# Patient Record
Sex: FEMALE | Race: WHITE | NOT HISPANIC OR LATINO
[De-identification: names, ages, dates, MRNs, and addresses within clinical notes are randomized per-mention and may not be internally consistent; named-entity substitution may affect disease eponyms.]

---

## 2022-04-12 ENCOUNTER — TRANSCRIPTION ENCOUNTER (OUTPATIENT)
Age: 67
End: 2022-04-12

## 2022-04-12 ENCOUNTER — APPOINTMENT (OUTPATIENT)
Dept: ORTHOPEDIC SURGERY | Facility: CLINIC | Age: 67
End: 2022-04-12
Payer: MEDICARE

## 2022-04-12 VITALS — BODY MASS INDEX: 28.25 KG/M2 | WEIGHT: 180 LBS | HEIGHT: 67 IN

## 2022-04-12 PROBLEM — Z00.00 ENCOUNTER FOR PREVENTIVE HEALTH EXAMINATION: Status: ACTIVE | Noted: 2022-04-12

## 2022-04-12 PROCEDURE — 20611 DRAIN/INJ JOINT/BURSA W/US: CPT | Mod: RT

## 2022-04-12 PROCEDURE — 99204 OFFICE O/P NEW MOD 45 MIN: CPT | Mod: 25

## 2022-04-12 PROCEDURE — 73562 X-RAY EXAM OF KNEE 3: CPT | Mod: RT

## 2022-04-12 RX ORDER — TICAGRELOR 60 MG/1
60 TABLET ORAL
Refills: 0 | Status: ACTIVE | COMMUNITY

## 2022-04-12 RX ORDER — METOPROLOL TARTRATE 25 MG/1
25 TABLET, FILM COATED ORAL
Refills: 0 | Status: ACTIVE | COMMUNITY

## 2022-04-12 RX ORDER — ASPIRIN CHEWABLE 81 MG/1
75 TABLET ORAL
Refills: 0 | Status: ACTIVE | COMMUNITY

## 2022-04-12 RX ORDER — ATORVASTATIN CALCIUM 80 MG/1
80 TABLET, FILM COATED ORAL
Refills: 0 | Status: ACTIVE | COMMUNITY

## 2022-04-12 RX ORDER — ICOSAPENT ETHYL 1000 MG/1
1 CAPSULE ORAL
Refills: 0 | Status: ACTIVE | COMMUNITY

## 2022-04-12 RX ORDER — METOPROLOL TARTRATE 75 MG/1
75 TABLET, FILM COATED ORAL
Refills: 0 | Status: ACTIVE | COMMUNITY

## 2022-04-12 RX ORDER — SERTRALINE HYDROCHLORIDE 100 MG/1
100 TABLET, FILM COATED ORAL
Refills: 0 | Status: ACTIVE | COMMUNITY

## 2022-04-12 NOTE — PROCEDURE
[Large Joint Injection] : Large joint injection [Right] : of the right [Knee] : knee [Pain] : pain [Alcohol] : alcohol [Betadine] : betadine [___ cc    3mg] :  Betamethasone ([facility]) ~Vcc of 3mg [___ cc    1%] : Lidocaine ~Vcc of 1%  [] : Patient tolerated procedure well [Call if redness, pain or fever occur] : call if redness, pain or fever occur [Apply ice for 15min out of every hour for the next 12-24 hours as tolerated] : apply ice for 15 minutes out of every hour for the next 12-24 hours as tolerated [Patient was advised to rest the joint(s) for ____ days] : patient was advised to rest the joint(s) for [unfilled] days [Previous OTC use and PT nontherapeutic] : patient has tried OTC's including aspirin, Ibuprofen, Aleve, etc or prescription NSAIDS, and/or exercises at home and/or physical therapy without satisfactory response [Patient had decreased mobility in the joint] : patient had decreased mobility in the joint [Risks, benefits, alternatives discussed / Verbal consent obtained] : the risks benefits, and alternatives have been discussed, and verbal consent was obtained [All ultrasound images have been permanently captured and stored accordingly in our picture archiving and communication system] : All ultrasound images have been permanently captured and stored accordingly in our picture archiving and communication system [Visualization of the needle and placement of injection was performed without complication] : visualization of the needle and placement of injection was performed without complication [Inflammation] : inflammation [de-identified] : for accurate placement of needle into knee joint

## 2022-04-12 NOTE — ASSESSMENT
[FreeTextEntry1] : acute onset of right knee pain since march 2022.  no injury.  no fevers or chills.   lateral and anterior knee pain. mild oa.  concern for lateral meniscus tear.\par \par history of mi - on blood thinner

## 2022-04-12 NOTE — PHYSICAL EXAM
[Right] : right knee [NL (0)] : extension 0 degrees [5___] : hamstring 5[unfilled]/5 [Equivocal] : equivocal Ne [There are no fractures, subluxations or dislocations. No significant abnormalities are seen] : There are no fractures, subluxations or dislocations. No significant abnormalities are seen [Mild tricompartmental OA medial narrowing] : Mild tricompartmental OA medial narrowing [] : no erythema [TWNoteComboBox7] : flexion 135 degrees

## 2022-04-12 NOTE — HISTORY OF PRESENT ILLNESS
[Dull/Aching] : dull/aching [Radiating] : radiating [Throbbing] : throbbing [Intermittent] : intermittent [Not working due to injury] : Work status: not working due to injury [de-identified] : 4/12/22:  acute onset of right knee pain since march 2022.  no injury or trauma, but possible exacerbation due to walking.  pain continues to persist and worse with bending, stairs, walking/standing for long periods of time.  reports to tightness and stiffness.  occ clicking.  reports to no buckling.  rest, ice, tylenol prn with little relief.  [] : Post Surgical Visit: no [FreeTextEntry1] : right knee [FreeTextEntry5] : no recent injury. Patient complains about pain in the right knee while walking. Pain radiates down to the right ankle and up to the hip. [de-identified] : none

## 2022-05-03 ENCOUNTER — APPOINTMENT (OUTPATIENT)
Dept: MRI IMAGING | Facility: CLINIC | Age: 67
End: 2022-05-03

## 2022-07-12 ENCOUNTER — APPOINTMENT (OUTPATIENT)
Dept: ORTHOPEDIC SURGERY | Facility: CLINIC | Age: 67
End: 2022-07-12

## 2022-07-12 VITALS — WEIGHT: 180 LBS | HEIGHT: 67 IN | BODY MASS INDEX: 28.25 KG/M2

## 2022-07-12 DIAGNOSIS — M25.561 PAIN IN RIGHT KNEE: ICD-10-CM

## 2022-07-12 DIAGNOSIS — M70.71 OTHER BURSITIS OF HIP, RIGHT HIP: ICD-10-CM

## 2022-07-12 PROCEDURE — 99213 OFFICE O/P EST LOW 20 MIN: CPT

## 2022-07-12 RX ORDER — METHYLPREDNISOLONE 4 MG/1
4 TABLET ORAL
Qty: 1 | Refills: 0 | Status: ACTIVE | COMMUNITY
Start: 2022-07-12 | End: 1900-01-01

## 2022-07-12 RX ORDER — TRAMADOL HYDROCHLORIDE AND ACETAMINOPHEN 37.5; 325 MG/1; MG/1
37.5-325 TABLET, FILM COATED ORAL TWICE DAILY
Qty: 14 | Refills: 0 | Status: ACTIVE | COMMUNITY
Start: 2022-07-12 | End: 1900-01-01

## 2022-07-12 NOTE — ASSESSMENT
[FreeTextEntry1] : acute onset of right knee pain since march 2022.  no injury.  no fevers or chills.   lateral and anterior knee pain. mild oa.  did not get better with rest, hep and PT for prolonged period of time.\par \par right lateral hip pain with bursitis.\par \par history of mi - on blood thinner

## 2022-07-12 NOTE — PHYSICAL EXAM
[NL (0)] : extension 0 degrees [Equivocal] : equivocal Ne [Right] : right hip [4___] : abduction 4[unfilled]/5 [5___] : adduction 5[unfilled]/5 [2+] : posterior tibialis pulse: 2+ [FreeTextEntry9] : no focal deficit [] : no erythema [TWNoteComboBox7] : flexion 135 degrees

## 2022-07-12 NOTE — HISTORY OF PRESENT ILLNESS
[10] : 10 [Burning] : burning [Dull/Aching] : dull/aching [Radiating] : radiating [Throbbing] : throbbing [Intermittent] : intermittent [Not working due to injury] : Work status: not working due to injury [] : Post Surgical Visit: no [FreeTextEntry1] : right knee [FreeTextEntry5] : no recent injury. Patient complains about pain in the right knee while walking. Pain radiates down to the right ankle and up to the hip. [de-identified] : none [de-identified] : 4/12/22:  acute onset of right knee pain since march 2022.  no injury or trauma, but possible exacerbation due to walking.  pain continues to persist and worse with bending, stairs, walking/standing for long periods of time.  reports to tightness and stiffness.  occ clicking.  reports to no buckling.  rest, ice, tylenol prn with little relief.

## 2022-07-12 NOTE — DISCUSSION/SUMMARY
[de-identified] : Instructions:  Progress Note completed by Angie Darby PA-C\par * Dr. Morejon -- The documentation recorded accurately reflects the decisions made by me during this visit.

## 2022-07-31 ENCOUNTER — FORM ENCOUNTER (OUTPATIENT)
Age: 67
End: 2022-07-31

## 2022-08-01 ENCOUNTER — APPOINTMENT (OUTPATIENT)
Dept: MRI IMAGING | Facility: CLINIC | Age: 67
End: 2022-08-01

## 2022-08-01 PROCEDURE — 73721 MRI JNT OF LWR EXTRE W/O DYE: CPT | Mod: LT

## 2022-08-04 ENCOUNTER — APPOINTMENT (OUTPATIENT)
Dept: ORTHOPEDIC SURGERY | Facility: CLINIC | Age: 67
End: 2022-08-04

## 2022-08-04 VITALS — HEIGHT: 67 IN | BODY MASS INDEX: 28.25 KG/M2 | WEIGHT: 180 LBS

## 2022-08-04 DIAGNOSIS — M17.11 UNILATERAL PRIMARY OSTEOARTHRITIS, RIGHT KNEE: ICD-10-CM

## 2022-08-04 PROCEDURE — 99214 OFFICE O/P EST MOD 30 MIN: CPT

## 2022-08-04 NOTE — DATA REVIEWED
[MRI] : MRI [Right] : of the right [Knee] : knee [Report was reviewed and noted in the chart] : The report was reviewed and noted in the chart [I independently reviewed and interpreted images and report] : I independently reviewed and interpreted images and report [FreeTextEntry1] : 1. Tricompartmental arthrosis with medial and lateral meniscal degeneration and fraying without tear, mild \par subchondral edema in the femoral trochlea and slight subchondral edema in the superior medial patella with \par slight patellofemoral effusion/synovitis.\par 2. Mild chronic ACL sprain.\par 3. Slight chronic MCL laxity.\par 4. Mild distal quadriceps tendinopathy.\par 5. No acute osseous injury or loose body.

## 2022-08-04 NOTE — PHYSICAL EXAM
[4___] : abduction 4[unfilled]/5 [2+] : posterior tibialis pulse: 2+ [Right] : right knee [NL (0)] : extension 0 degrees [5___] : hamstring 5[unfilled]/5 [Equivocal] : equivocal Ne [FreeTextEntry9] : no focal deficit [] : no erythema [TWNoteComboBox7] : flexion 135 degrees

## 2022-08-04 NOTE — ASSESSMENT
[FreeTextEntry1] : acute onset of right knee pain since march 2022.  no injury.  no fevers or chills.   lateral and anterior knee pain. mild oa.  MRI right knee shows tricompartmental OA, meniscal fraying. improved today.  \par \par right lateral hip pain with bursitis.\par \par history of mi - on blood thinner

## 2022-08-04 NOTE — HISTORY OF PRESENT ILLNESS
[4] : 4 [2] : 2 [Dull/Aching] : dull/aching [Intermittent] : intermittent [Rest] : rest [Walking] : walking [de-identified] : 4/12/22:  acute onset of right knee pain since march 2022.  no injury or trauma, but possible exacerbation due to walking.  pain continues to persist and worse with bending, stairs, walking/standing for long periods of time.  reports to tightness and stiffness.  occ clicking.  reports to no buckling.  rest, ice, tylenol prn with little relief. \par 7/12/22:  follow up right knee.  csi on 4/12/22.  minimal oa present.\par 8/4/22: Here to review right knee MRI.  feeling better. [FreeTextEntry9] : Tylenol